# Patient Record
Sex: FEMALE | Race: OTHER | Employment: FULL TIME | URBAN - METROPOLITAN AREA
[De-identification: names, ages, dates, MRNs, and addresses within clinical notes are randomized per-mention and may not be internally consistent; named-entity substitution may affect disease eponyms.]

---

## 2018-09-11 PROBLEM — Z34.90 PREGNANCY: Status: ACTIVE | Noted: 2018-09-11

## 2018-09-17 PROBLEM — N39.0 E-COLI UTI: Status: ACTIVE | Noted: 2018-09-17

## 2018-09-17 PROBLEM — B96.20 E-COLI UTI: Status: ACTIVE | Noted: 2018-09-17

## 2018-09-17 PROBLEM — Z28.39 MATERNAL VARICELLA, NON-IMMUNE: Status: ACTIVE | Noted: 2018-09-17

## 2018-09-17 PROBLEM — O09.899 MATERNAL VARICELLA, NON-IMMUNE: Status: ACTIVE | Noted: 2018-09-17

## 2018-09-17 PROBLEM — O09.899 RUBELLA NON-IMMUNE STATUS, ANTEPARTUM: Status: ACTIVE | Noted: 2018-09-17

## 2018-09-17 PROBLEM — Z28.39 RUBELLA NON-IMMUNE STATUS, ANTEPARTUM: Status: ACTIVE | Noted: 2018-09-17

## 2019-03-27 ENCOUNTER — HOSPITAL ENCOUNTER (INPATIENT)
Age: 19
LOS: 2 days | Discharge: HOME | End: 2019-03-29
Attending: OBSTETRICS & GYNECOLOGY | Admitting: OBSTETRICS & GYNECOLOGY
Payer: COMMERCIAL

## 2019-03-27 ENCOUNTER — ANESTHESIA EVENT (OUTPATIENT)
Dept: LABOR AND DELIVERY | Age: 19
End: 2019-03-27
Payer: COMMERCIAL

## 2019-03-27 ENCOUNTER — ANESTHESIA (OUTPATIENT)
Dept: LABOR AND DELIVERY | Age: 19
End: 2019-03-27
Payer: COMMERCIAL

## 2019-03-27 PROBLEM — R10.9 ABDOMINAL PAIN DURING PREGNANCY IN THIRD TRIMESTER: Status: ACTIVE | Noted: 2019-03-27

## 2019-03-27 PROBLEM — O26.893 ABDOMINAL PAIN DURING PREGNANCY IN THIRD TRIMESTER: Status: ACTIVE | Noted: 2019-03-27

## 2019-03-27 PROBLEM — Z37.9 NORMAL LABOR: Status: ACTIVE | Noted: 2019-03-27

## 2019-03-27 LAB
ABO + RH BLD: NORMAL
ARTERIAL PATENCY WRIST A: ABNORMAL
ARTERIAL PATENCY WRIST A: ABNORMAL
BASE DEFICIT BLD-SCNC: 5 MMOL/L
BASE DEFICIT BLD-SCNC: 5 MMOL/L
BDY SITE: ABNORMAL
BDY SITE: ABNORMAL
BLOOD GROUP ANTIBODIES SERPL: NORMAL
BODY TEMPERATURE: 98.6
BODY TEMPERATURE: 98.6
CO2 BLD-SCNC: 20 MMOL/L
CO2 BLD-SCNC: 21 MMOL/L
COLLECT TIME,HTIME: 1444
COLLECT TIME,HTIME: 1444
ERYTHROCYTE [DISTWIDTH] IN BLOOD BY AUTOMATED COUNT: 13.8 % (ref 11.9–14.6)
GAS FLOW.O2 O2 DELIVERY SYS: ABNORMAL L/MIN
GAS FLOW.O2 O2 DELIVERY SYS: ABNORMAL L/MIN
HCO3 BLD-SCNC: 18.9 MMOL/L (ref 22–26)
HCO3 BLDV-SCNC: 20 MMOL/L (ref 23–28)
HCT VFR BLD AUTO: 42.9 % (ref 35.8–46.3)
HGB BLD-MCNC: 14.5 G/DL (ref 11.7–15.4)
MCH RBC QN AUTO: 27.1 PG (ref 26.1–32.9)
MCHC RBC AUTO-ENTMCNC: 33.8 G/DL (ref 31.4–35)
MCV RBC AUTO: 80 FL (ref 79.6–97.8)
NRBC # BLD: 0 K/UL (ref 0–0.2)
PCO2 BLDCO: 31 MMHG (ref 32–68)
PCO2 BLDCO: 35 MMHG (ref 32–68)
PH BLDCO: 7.37 [PH] (ref 7.15–7.38)
PH BLDCO: 7.39 [PH] (ref 7.15–7.38)
PLATELET # BLD AUTO: 271 K/UL (ref 150–450)
PMV BLD AUTO: 11.1 FL (ref 9.4–12.3)
PO2 BLDCO: 36 MMHG
PO2 BLDCO: 37 MMHG
RBC # BLD AUTO: 5.36 M/UL (ref 4.05–5.2)
SAO2 % BLD: 69 % (ref 95–98)
SAO2 % BLDV: 70 % (ref 65–88)
SERVICE CMNT-IMP: ABNORMAL
SERVICE CMNT-IMP: ABNORMAL
SPECIMEN EXP DATE BLD: NORMAL
SPECIMEN TYPE: ABNORMAL
SPECIMEN TYPE: ABNORMAL
WBC # BLD AUTO: 6.8 K/UL (ref 4.3–11.1)

## 2019-03-27 PROCEDURE — 77030018846 HC SOL IRR STRL H20 ICUM -A

## 2019-03-27 PROCEDURE — 4A1HXCZ MONITORING OF PRODUCTS OF CONCEPTION, CARDIAC RATE, EXTERNAL APPROACH: ICD-10-PCS | Performed by: OBSTETRICS & GYNECOLOGY

## 2019-03-27 PROCEDURE — 76060000078 HC EPIDURAL ANESTHESIA

## 2019-03-27 PROCEDURE — 77030020255 HC SOL INJ LR 1000ML BG

## 2019-03-27 PROCEDURE — 75410000002 HC LABOR FEE PER 1 HR

## 2019-03-27 PROCEDURE — 75410000003 HC RECOV DEL/VAG/CSECN EA 0.5 HR

## 2019-03-27 PROCEDURE — A4300 CATH IMPL VASC ACCESS PORTAL: HCPCS | Performed by: ANESTHESIOLOGY

## 2019-03-27 PROCEDURE — 77030007880 HC KT SPN EPDRL BBMI -B: Performed by: ANESTHESIOLOGY

## 2019-03-27 PROCEDURE — 77030011943

## 2019-03-27 PROCEDURE — 10907ZC DRAINAGE OF AMNIOTIC FLUID, THERAPEUTIC FROM PRODUCTS OF CONCEPTION, VIA NATURAL OR ARTIFICIAL OPENING: ICD-10-PCS | Performed by: OBSTETRICS & GYNECOLOGY

## 2019-03-27 PROCEDURE — 77030014125 HC TY EPDRL BBMI -B: Performed by: ANESTHESIOLOGY

## 2019-03-27 PROCEDURE — 74011250636 HC RX REV CODE- 250/636: Performed by: OBSTETRICS & GYNECOLOGY

## 2019-03-27 PROCEDURE — 82803 BLOOD GASES ANY COMBINATION: CPT

## 2019-03-27 PROCEDURE — 74011250637 HC RX REV CODE- 250/637: Performed by: OBSTETRICS & GYNECOLOGY

## 2019-03-27 PROCEDURE — 75410000000 HC DELIVERY VAGINAL/SINGLE

## 2019-03-27 PROCEDURE — 86900 BLOOD TYPING SEROLOGIC ABO: CPT

## 2019-03-27 PROCEDURE — 99282 EMERGENCY DEPT VISIT SF MDM: CPT

## 2019-03-27 PROCEDURE — 74011250636 HC RX REV CODE- 250/636: Performed by: ANESTHESIOLOGY

## 2019-03-27 PROCEDURE — 65270000029 HC RM PRIVATE

## 2019-03-27 PROCEDURE — 85027 COMPLETE CBC AUTOMATED: CPT

## 2019-03-27 PROCEDURE — 74011000250 HC RX REV CODE- 250

## 2019-03-27 PROCEDURE — 74011250636 HC RX REV CODE- 250/636

## 2019-03-27 RX ORDER — LIDOCAINE HYDROCHLORIDE 20 MG/ML
JELLY TOPICAL
Status: DISCONTINUED | OUTPATIENT
Start: 2019-03-27 | End: 2019-03-27 | Stop reason: HOSPADM

## 2019-03-27 RX ORDER — FENTANYL CITRATE 50 UG/ML
INJECTION, SOLUTION INTRAMUSCULAR; INTRAVENOUS AS NEEDED
Status: DISCONTINUED | OUTPATIENT
Start: 2019-03-27 | End: 2019-03-27 | Stop reason: HOSPADM

## 2019-03-27 RX ORDER — IBUPROFEN 800 MG/1
800 TABLET ORAL
Status: DISCONTINUED | OUTPATIENT
Start: 2019-03-27 | End: 2019-03-29 | Stop reason: HOSPADM

## 2019-03-27 RX ORDER — ROPIVACAINE HYDROCHLORIDE 2 MG/ML
INJECTION, SOLUTION EPIDURAL; INFILTRATION; PERINEURAL
Status: DISCONTINUED | OUTPATIENT
Start: 2019-03-27 | End: 2019-03-27 | Stop reason: HOSPADM

## 2019-03-27 RX ORDER — BUTORPHANOL TARTRATE 2 MG/ML
1 INJECTION INTRAMUSCULAR; INTRAVENOUS
Status: DISCONTINUED | OUTPATIENT
Start: 2019-03-27 | End: 2019-03-27 | Stop reason: HOSPADM

## 2019-03-27 RX ORDER — LIDOCAINE HYDROCHLORIDE AND EPINEPHRINE 15; 5 MG/ML; UG/ML
INJECTION, SOLUTION EPIDURAL
Status: COMPLETED | OUTPATIENT
Start: 2019-03-27 | End: 2019-03-27

## 2019-03-27 RX ORDER — DIPHENHYDRAMINE HCL 25 MG
25 CAPSULE ORAL
Status: DISCONTINUED | OUTPATIENT
Start: 2019-03-27 | End: 2019-03-29 | Stop reason: HOSPADM

## 2019-03-27 RX ORDER — DEXTROSE, SODIUM CHLORIDE, SODIUM LACTATE, POTASSIUM CHLORIDE, AND CALCIUM CHLORIDE 5; .6; .31; .03; .02 G/100ML; G/100ML; G/100ML; G/100ML; G/100ML
125 INJECTION, SOLUTION INTRAVENOUS CONTINUOUS
Status: DISCONTINUED | OUTPATIENT
Start: 2019-03-27 | End: 2019-03-27 | Stop reason: HOSPADM

## 2019-03-27 RX ORDER — ONDANSETRON 4 MG/1
4 TABLET, ORALLY DISINTEGRATING ORAL
Status: ACTIVE | OUTPATIENT
Start: 2019-03-27 | End: 2019-03-28

## 2019-03-27 RX ORDER — MINERAL OIL
120 OIL (ML) ORAL
Status: COMPLETED | OUTPATIENT
Start: 2019-03-27 | End: 2019-03-27

## 2019-03-27 RX ORDER — FENTANYL CITRATE 50 UG/ML
INJECTION, SOLUTION INTRAMUSCULAR; INTRAVENOUS
Status: COMPLETED
Start: 2019-03-27 | End: 2019-03-27

## 2019-03-27 RX ORDER — OXYTOCIN/RINGER'S LACTATE 15/250 ML
250 PLASTIC BAG, INJECTION (ML) INTRAVENOUS ONCE
Status: COMPLETED | OUTPATIENT
Start: 2019-03-27 | End: 2019-03-27

## 2019-03-27 RX ORDER — SODIUM CHLORIDE 0.9 % (FLUSH) 0.9 %
5-40 SYRINGE (ML) INJECTION EVERY 8 HOURS
Status: DISCONTINUED | OUTPATIENT
Start: 2019-03-27 | End: 2019-03-27 | Stop reason: HOSPADM

## 2019-03-27 RX ORDER — HYDROCODONE BITARTRATE AND ACETAMINOPHEN 5; 325 MG/1; MG/1
1 TABLET ORAL
Status: DISCONTINUED | OUTPATIENT
Start: 2019-03-27 | End: 2019-03-29 | Stop reason: HOSPADM

## 2019-03-27 RX ORDER — DOCUSATE SODIUM 100 MG/1
100 CAPSULE, LIQUID FILLED ORAL 2 TIMES DAILY
Status: DISCONTINUED | OUTPATIENT
Start: 2019-03-27 | End: 2019-03-29 | Stop reason: HOSPADM

## 2019-03-27 RX ORDER — SIMETHICONE 80 MG
80 TABLET,CHEWABLE ORAL
Status: DISCONTINUED | OUTPATIENT
Start: 2019-03-27 | End: 2019-03-29 | Stop reason: HOSPADM

## 2019-03-27 RX ORDER — LIDOCAINE HYDROCHLORIDE 10 MG/ML
1 INJECTION INFILTRATION; PERINEURAL
Status: DISCONTINUED | OUTPATIENT
Start: 2019-03-27 | End: 2019-03-27 | Stop reason: HOSPADM

## 2019-03-27 RX ORDER — FENTANYL CITRATE 50 UG/ML
100 INJECTION, SOLUTION INTRAMUSCULAR; INTRAVENOUS ONCE
Status: DISCONTINUED | OUTPATIENT
Start: 2019-03-27 | End: 2019-03-27

## 2019-03-27 RX ORDER — SODIUM CHLORIDE 0.9 % (FLUSH) 0.9 %
5-40 SYRINGE (ML) INJECTION AS NEEDED
Status: DISCONTINUED | OUTPATIENT
Start: 2019-03-27 | End: 2019-03-27 | Stop reason: HOSPADM

## 2019-03-27 RX ORDER — DIPHENHYDRAMINE HYDROCHLORIDE 50 MG/ML
12.5 INJECTION, SOLUTION INTRAMUSCULAR; INTRAVENOUS
Status: COMPLETED | OUTPATIENT
Start: 2019-03-27 | End: 2019-03-27

## 2019-03-27 RX ADMIN — IBUPROFEN 800 MG: 800 TABLET, FILM COATED ORAL at 17:57

## 2019-03-27 RX ADMIN — MINERAL OIL 120 ML: 471.95 OIL ORAL at 14:56

## 2019-03-27 RX ADMIN — SODIUM CHLORIDE, SODIUM LACTATE, POTASSIUM CHLORIDE, AND CALCIUM CHLORIDE 500 ML: 600; 310; 30; 20 INJECTION, SOLUTION INTRAVENOUS at 11:56

## 2019-03-27 RX ADMIN — LIDOCAINE HYDROCHLORIDE AND EPINEPHRINE 4.5 ML: 15; 5 INJECTION, SOLUTION EPIDURAL at 12:09

## 2019-03-27 RX ADMIN — DOCUSATE SODIUM 100 MG: 100 CAPSULE, LIQUID FILLED ORAL at 17:57

## 2019-03-27 RX ADMIN — FENTANYL CITRATE 15 MCG: 50 INJECTION, SOLUTION INTRAMUSCULAR; INTRAVENOUS at 12:08

## 2019-03-27 RX ADMIN — SODIUM CHLORIDE, SODIUM LACTATE, POTASSIUM CHLORIDE, CALCIUM CHLORIDE, AND DEXTROSE MONOHYDRATE 125 ML/HR: 600; 310; 30; 20; 5 INJECTION, SOLUTION INTRAVENOUS at 12:00

## 2019-03-27 RX ADMIN — FENTANYL CITRATE 85 MCG: 50 INJECTION, SOLUTION INTRAMUSCULAR; INTRAVENOUS at 12:09

## 2019-03-27 RX ADMIN — DIPHENHYDRAMINE HYDROCHLORIDE 12.5 MG: 50 INJECTION, SOLUTION INTRAMUSCULAR; INTRAVENOUS at 13:14

## 2019-03-27 RX ADMIN — ROPIVACAINE HYDROCHLORIDE 10 ML/HR: 2 INJECTION, SOLUTION EPIDURAL; INFILTRATION; PERINEURAL at 12:09

## 2019-03-27 RX ADMIN — Medication 15000 MILLI-UNITS/HR: at 14:49

## 2019-03-27 NOTE — PROGRESS NOTES
Labor Progress Note Patient seen, fetal heart rate and contraction pattern evaluated, patient examined. Patient Vitals for the past 1 hrs: 
 BP Pulse 03/27/19 1345 109/64 65  
03/27/19 1331 103/62 64  
03/27/19 1314 102/59 67 Physical Exam: 
Cervical Exam:  (Ant-lip)/100 %/0/ Membranes:  Artificial Rupture of Membranes; Amniotic Fluid: medium amount of clear fluid Uterine Activity: Frequency: Every 2 minutes Fetal Heart Rate: Reactive Assessment/Plan: 
Reassuring fetal status, Continue plan for vaginal delivery

## 2019-03-27 NOTE — PROGRESS NOTES
Assisted to bathroom. Voided 300 ml clear yellow urine. Tamara care taught and voiced understanding. Returned to bed with assistance. Sitting on side of bed at present per request. Tolerated well.

## 2019-03-27 NOTE — PROGRESS NOTES
SVE as documented. 1430  ml 
1435 Begin pushing. 1442 Call for delivery staff. Set up for delivery. 65  female apgars 9&9. Infant to warmer per mom's request.  
3545 Placenta expressed. Pitocin infusing. No repair needed. 1500 Legs down, kulwant care completed. Recovery begun.

## 2019-03-27 NOTE — ROUTINE PROCESS
SBAR IN Report: Mother Verbal report received from [address] [place] [address] on this patient, who is now being transferred from L&D for routine progression of care. The patient is not wearing a green \"Anesthesia-Duramorph\" band. Report consisted of patient's Situation, Background, Assessment and Recommendations (SBAR). [place] ID bands were compared with the identification form, and verified with the patient and transferring nurse. Information from the SBAR, Intake/Output, MAR and Recent Results and the [place] Report was reviewed with the transferring nurse; opportunity for questions and clarification provided.

## 2019-03-27 NOTE — PROGRESS NOTES
Admitted for labor. Admission assessment as documented. Oriented to room, call system and white board. Plan of care reviewed and questions answered. Consents signed, identification band verified and placed. IV placed, blood work drawn per order and sent to lab.

## 2019-03-27 NOTE — PROGRESS NOTES
1200 Anesthesia at bedside. Pt to SOB. 1210 Epidural catheter in place, test dose given, see anesthesia records, serial BP's begin.

## 2019-03-27 NOTE — L&D DELIVERY NOTE
Delivery Summary    Patient: Dk Hernandez MRN: [id number]  SSN: [id number]    YOB: 2000  Age: 19 y.o. Sex: female       Information for the patient's :  Elie Dutta [[id number]]       Labor Events:    Labor: No   Rupture Date: 3/27/2019   Rupture Time: 2:01 PM   Rupture Type AROM   Amniotic Fluid Volume: Moderate    Amniotic Fluid Description: Clear None   Induction: None       Augmentation: None   Labor Events: None     Cervical Ripening:     None     Delivery Events:  Episiotomy: None   Laceration(s): None     Repaired: None    Number of Repair Packets:     Suture Type and Size:       Estimated Blood Loss (ml):  ml       Delivery Date: 3/27/2019    Delivery Time: 2:44 PM  Delivery Type: Vaginal, Spontaneous  Sex:  Female     Gestational Age: 43w4d   Delivery Clinician: Mo Gutierrez  Living Status: Living   Delivery Location: & 422          APGARS  One minute Five minutes Ten minutes   Skin color: 1   1        Heart rate: 2   2        Grimace: 2   2        Muscle tone: 2   2        Breathin   2        Totals: 9   9            Presentation: Vertex    Position: Left Occiput Anterior  Resuscitation Method:  Tactile Stimulation;Suctioning-bulb     Meconium Stained: None      Cord Information: 3 Vessels  Complications: None  Cord around:    Delayed cord clamping? Yes  Cord clamped date/time:3/27/2019  2:46 PM  Disposition of Cord Blood: Lab    Blood Gases Sent?: Yes    Placenta:  Date/Time: 3/27/2019  2:49 PM  Removal: Spontaneous      Appearance: Normal;Intact      Measurements:  Birth Weight: 6 lb 8.2 oz (2.955 kg)      Birth Length: 1' 7.88\" (0.505 m)      Head Circumference: 1' 0.6\" (0.32 m)      Chest Circumference: 1' 0.6\" (0.32 m)     Abdominal Girth:       Other Providers:   DIAMOND Phillips;DALILA DUENAS;BIA MA;CORINNE Mata [facility];Nolan MARK, Obstetrician;Primary Nurse;Primary  Nurse; Anesthesiologist;Crna;Scrub Tech;Charge Nurse           Group B Strep:   Lab Results   Component Value Date/Time    GrBStrep, External negative 2019     Information for the patient's :  Cloteal Humberto [[id number]]   No results found for: ABORH, PCTABR, PCTDIG, BILI, ABORHEXT, ABORH    No results for input(s): PCO2CB, PO2CB, HCO3I, SO2I, IBD, PTEMPI, SPECTI, PHICB, ISITE, IDEV, IALLEN in the last 72 hours.

## 2019-03-27 NOTE — ANESTHESIA PREPROCEDURE EVALUATION
Relevant Problems No relevant active problems Anesthetic History No history of anesthetic complications Review of Systems / Medical History Patient summary reviewed and pertinent labs reviewed Pulmonary Within defined limits Neuro/Psych Within defined limits Cardiovascular Within defined limits Exercise tolerance: >4 METS 
  
GI/Hepatic/Renal 
  
GERD: well controlled Endo/Other Within defined limits Other Findings Physical Exam 
 
Airway Mallampati: II 
TM Distance: 4 - 6 cm Neck ROM: normal range of motion Mouth opening: Normal 
 
 Cardiovascular Regular rate and rhythm,  S1 and S2 normal,  no murmur, click, rub, or gallop Dental 
 
 
  
Pulmonary Breath sounds clear to auscultation Abdominal 
 
 
 
 Other Findings Anesthetic Plan ASA: 2 Anesthesia type: CSE Post-op pain plan if not by surgeon: epidural opioid, indwelling epidural catheter and intrathecal opiates Anesthetic plan and risks discussed with: Patient and Spouse

## 2019-03-27 NOTE — PROGRESS NOTES
Pt here to triage, doubled over hurting with contractions. States she was 3 cms in the office yesterday. Pt placed in , dr Carleen Maldonado at bs for sve- 6-7 cms and admitted to labor room.  Would like an epidural.

## 2019-03-27 NOTE — ANESTHESIA PROCEDURE NOTES
CSE Block Start time: 3/27/2019 12:05 PM 
End time: 3/27/2019 12:10 PM 
Performed by: Lakisha Ramirez MD 
Authorized by: Lakisha Ramirez MD  
 
Pre-Procedure Indications: at surgeon's request and primary anesthetic   
preanesthetic checklist: patient identified, risks and benefits discussed, anesthesia consent, site marked, patient being monitored and timeout performed Timeout Time: 12:03 Procedure:  
Patient Position:  Seated Prep Region:  Lumbar Prep: chlorhexidine Location:  L2-3 Epidural Needle:  
Needle Type:  Tuohy Needle Gauge:  18 G Injection Technique:  Loss of resistance using saline Attempts:  1 Spinal Needle:  
Needle Type:  Quincke Needle Gauge:  25 G Catheter:  
Catheter Type:  Open end Catheter Size:  20 G Catheter at Skin Depth (cm):  5 Depth in Epidural Space (cm):  5 Events: no blood with aspiration, no cerebrospinal fluid with aspiration, no paresthesia and negative aspiration test   
Test Dose:  Lidocaine 1.5% w/ epi and negative Assessment:  
Catheter Secured:  Tegaderm and tape Insertion:  Uncomplicated Patient tolerance:  Patient tolerated the procedure well with no immediate complications

## 2019-03-27 NOTE — PROGRESS NOTES
1700 patient with heavy bleeding on pad with couple small clots on pad, fundus +1, slight right, massaged fundus, expressed couple more small clots, walnut size Patient assisted to side of bed, assistance of 2 staff, patient stood and knees immediately buckled, patient assisted back to bed 1745 I & O cath attempted x1, soft tissue around urethra with edema, difficult to locate, bleeding much better, no trickle, fundus firm +1, midline to slight right Will continue to monitor

## 2019-03-27 NOTE — H&P
History & Physical 
 
Name: Yue Yeh MRN: [id number]  SSN: [id number] YOB: 2000  Age: 19 y.o. Sex: female Chief c/o: painful contractions Subjective:  
 
Estimated Date of Delivery: 19 OB History  Para Term  AB Living 2 1 1 0 0 1 SAB TAB Ectopic Molar Multiple Live Births  
0 0 0   0 1 # Outcome Date GA Lbr Prakash/2nd Weight Sex Delivery Anes PTL Lv  
2 Current 1 Term 10/23/16 37w4d  2.863 kg M Vag-Spont   JAQUELINE Birth Comments: S Ms. Apurva Ndiaye is admitted with pregnancy at 38w1d for active labor. Prenatal course was normal. Please see prenatal records for details. Past Medical History:  
Diagnosis Date  Postpartum depression No past surgical history on file. Social History Occupational History  Not on file Tobacco Use  Smoking status: Never Smoker  Smokeless tobacco: Never Used Substance and Sexual Activity  Alcohol use: No  
  Comment: none with +UPT  Drug use: No  
 Sexual activity: Yes  
  Partners: Male Family History Problem Relation Age of Onset  Diabetes Mother  Hypertension Maternal Grandmother  No Known Problems Father No Known Allergies Prior to Admission medications Medication Sig Start Date End Date Taking? Authorizing Provider  
cholecalciferol, vitamin D3, 2,000 unit tab Take  by mouth. Provider, Historical  
BABY ASPIRIN PO Take  by mouth. Provider, Historical  
PRENATAL VIT W-CA,FE,FA,<1 MG, (PRENATAL #2 PO) Take  by mouth daily. Other, MD Becky  
  
 
Review of Systems: A comprehensive review of systems was negative except for that written in the HPI. Objective:  
 
Vitals: There were no vitals filed for this visit. Physical Exam: 
Patient without distress. Heart: Regular rate and rhythm Lung: clear to auscultation throughout lung fields, no wheezes, no rales, no rhonchi and normal respiratory effort Abdomen: soft, nontender Fundus: soft and non tender Perineum: blood absent, amniotic fluid absent Cervical Exam: 7 cm dilated 100% effaced 0 station Presenting Part: cephalic Lower Extremities:  - Edema No 
 - Patellar Reflexes: 2+ bilaterally Membranes:  Intact Fetal Heart Rate: Reactive Prenatal Labs:  
Lab Results Component Value Date/Time  
 Rubella, External non immune 2018 GrBStrep, External negative 2019 HBsAg, External negative 2018 HIV, External NR 2018 RPR, External NR 2018 ABO,Rh O positive 2018 Assessment/Plan: Active Problems: 
  Abdominal pain during pregnancy in third trimester (3/27/2019) Normal labor (3/27/2019) Plan: 20 yo  at [address] for Reassuring fetal status, Labor  Progressing normally, Continue plan for vaginal delivery. Group B Strep was negative. Signed By:  Jorge Luis Zazueta MD   
 2019

## 2019-03-27 NOTE — PROGRESS NOTES
SBAR OUT Report: Mother Verbal report given to Georgia Navarro RN on this patient, who is now being transferred to MIU for routine progression of care. The patient is not wearing a green \"Anesthesia-Duramorph\" band. Report consisted of patient's Situation, Background, Assessment and Recommendations (SBAR).  ID bands were compared with the identification form, and verified with the patient and receiving nurse. Information from the SBAR and the [address] [facility] Report was reviewed with the receiving nurse; opportunity for questions and clarification provided.

## 2019-03-27 NOTE — PROGRESS NOTES
Pt reports feeling pressure. SVE as documented. MD made aware. MD in sx downstairs. Will be up shortly to assess.

## 2019-03-27 NOTE — PROGRESS NOTES
available from 4:30 p.m. - 1:00 a.m. Please call [phone] with any interpreting requests. Thank you, TARAH Wild / 
Joselito Martinez Patient Relations & Interpreting Services 
c: [phone] / [email or web address] 
[address] [place] [address] / [place], [address] [place] [address] 
[email or web address]

## 2019-03-28 PROCEDURE — 65270000029 HC RM PRIVATE

## 2019-03-28 PROCEDURE — 74011250637 HC RX REV CODE- 250/637: Performed by: OBSTETRICS & GYNECOLOGY

## 2019-03-28 RX ADMIN — IBUPROFEN 800 MG: 800 TABLET, FILM COATED ORAL at 23:49

## 2019-03-28 RX ADMIN — HYDROCODONE BITARTRATE AND ACETAMINOPHEN 1 TABLET: 5; 325 TABLET ORAL at 09:21

## 2019-03-28 RX ADMIN — DOCUSATE SODIUM 100 MG: 100 CAPSULE, LIQUID FILLED ORAL at 18:19

## 2019-03-28 RX ADMIN — IBUPROFEN 800 MG: 800 TABLET, FILM COATED ORAL at 18:19

## 2019-03-28 RX ADMIN — DOCUSATE SODIUM 100 MG: 100 CAPSULE, LIQUID FILLED ORAL at 09:21

## 2019-03-28 RX ADMIN — IBUPROFEN 800 MG: 800 TABLET, FILM COATED ORAL at 04:24

## 2019-03-28 NOTE — PROGRESS NOTES
Chart reviewed - patient with a history of PPD with first child ().  met with patient and FOB. Patient states that onset of PPD was \"right away\" and lasted until 2018. Patient states that she did not seek medication, and she attended 1 therapy session. Patient states that her mood has been \"good\" during pregnancy. She denies any current anxiety/depression since delivery. Patient reports having a large local support system. Education provided on increased chances of experiencing postpartum depression due to history. Patient/ were encouraged to monitor her feelings during this next year.  provided informational packet on  mood disorder education/resources. Family receptive to receiving information and denied any additional needs from . Family has this 's contact information should any needs/questions arise. Patient does not have a PCP - list of PCPs provided. Dari Mench[address]

## 2019-03-28 NOTE — LACTATION NOTE

## 2019-03-28 NOTE — PROGRESS NOTES
Intepreter Available onsite for any request from 8:00 a.m to 4:30 p.m. Rosamaria Linda / [place] [phone]

## 2019-03-28 NOTE — PROGRESS NOTES
available on-site from 4:30 p.m. - 1:00 a.m. Please call [phone] with any interpreting requests. Thank you, TARAH Hand / 
Janneth Sandoval Patient Relations & Interpreting Services 
c: [phone] / [email or web address] [address] [facility] [address] / [place], [address] [facility] 
[email or web address]

## 2019-03-28 NOTE — PROGRESS NOTES
Post-Partum Day Number 1 Progress Note Patient doing well post-partum without significant complaint. Voiding withour difficulty, normal lochia. Vitals:   
Patient Vitals for the past 8 hrs: 
 BP Temp Pulse Resp SpO2  
19 0750 94/46 97.3 °F (36.3 °C) 70 16 98 % Temp (24hrs), Av.9 °F (36.6 °C), Min:97.3 °F (36.3 °C), Max:98.6 °F (37 °C) Vital signs stable, afebrile. Exam:  Patient without distress. Abdomen soft, fundus firm at level of umbilicus, nontender Perineum with normal lochia noted. Lower extremities are negative for swelling, cords or tenderness. Labs:  
Recent Results (from the past 24 hour(s)) CBC W/O DIFF Collection Time: 19 11:47 AM  
Result Value Ref Range WBC 6.8 4.3 - 11.1 K/uL  
 RBC 5.36 (H) 4.05 - 5.2 M/uL  
 HGB 14.5 11.7 - 15.4 g/dL HCT 42.9 35.8 - 46.3 % MCV 80.0 79.6 - 97.8 FL  
 MCH 27.1 26.1 - 32.9 PG  
 MCHC 33.8 31.4 - 35.0 g/dL  
 RDW 13.8 11.9 - 14.6 % PLATELET 926 198 - 935 K/uL MPV 11.1 9.4 - 12.3 FL ABSOLUTE NRBC 0.00 0.0 - 0.2 K/uL  
TYPE & SCREEN Collection Time: 19 11:47 AM  
Result Value Ref Range Crossmatch Expiration 2019 ABO/Rh(D) O POSITIVE Antibody screen NEG   
BLOOD GAS, CORD BLOOD Collection Time: 19  2:56 PM  
Result Value Ref Range Device: ROOM AIR    
 pH, cord blood (POC) 7.391 (H) 7.15 - 7.38    
 pCO2 cord blood 31 (L) 32 - 68 mmHg  
 pO2 cord blood 36 mmHg HCO3 (POC) 18.9 (L) 22 - 26 MMOL/L  
 sO2 (POC) 69 (L) 95 - 98 % Base deficit (POC) 5 mmol/L Allens test (POC) NOT APPLICABLE Site CORD Patient temp. 98.6 Specimen type (POC) ARTERIAL CORD Performed by Genny   
 CO2, POC 20 MMOL/L  
 COLLECT TIME 1,444 BLOOD GAS, CORD BLOOD Collection Time: 19  2:56 PM  
Result Value Ref Range  Device: ROOM AIR    
 pH, cord blood (POC) 7.369 7.15 - 7.38    
 pCO2 cord blood 35 32 - 68 mmHg  
 pO2 cord blood 37 mmHg HCO3, venous (POC) 20.0 (L) 23 - 28 MMOL/L  
 sO2, venous (POC) 70 65 - 88 % Base deficit (POC) 5 mmol/L Allens test (POC) NOT APPLICABLE Site CORD Patient temp. 98.6 Specimen type (POC) VENOUS CORD Performed by Genny   
 CO2, POC 21 MMOL/L  
 COLLECT TIME 1,444 Assessment and Plan:  Patient appears to be having uncomplicated post-partum course. Continue routine perineal care and maternal education. Plan discharge tomorrow if no problems occur. Pt complaining of low BP- had several low BP's antepartum likely related to residual epidural and or narcotics Encouraged to hydrate slow position changes

## 2019-03-28 NOTE — LACTATION NOTE
This note was copied from a baby's chart. Mom stated difficulty in latching on L. Planned to assist at next feeding. Mom already had baby on well in cradle on L at Lactation's arrival.  Mom uncomfortable. Assisted with breastfeeding positioning. Supported moms back and arms for more comfort. Baby fed well. Demonstrated manual lip flange. Encouraged frequent feeding and watch output. Encouraged to unwrap baby for more awake feedings.

## 2019-03-28 NOTE — PROGRESS NOTES
03/28/19 0425 Pain Assessment Pain Scale 1 Numeric (0 - 10) Pain Intensity 1 3 Pain Location 1 Abdomen Pain Orientation 1 Lower Pain Description 1 Cramping Pain Intervention(s) 1 Medication (see MAR) Motrin 800mg given for pain.

## 2019-03-29 VITALS
DIASTOLIC BLOOD PRESSURE: 61 MMHG | TEMPERATURE: 97.7 F | HEART RATE: 81 BPM | RESPIRATION RATE: 16 BRPM | OXYGEN SATURATION: 97 % | SYSTOLIC BLOOD PRESSURE: 100 MMHG

## 2019-03-29 PROCEDURE — 90715 TDAP VACCINE 7 YRS/> IM: CPT | Performed by: OBSTETRICS & GYNECOLOGY

## 2019-03-29 PROCEDURE — 74011250636 HC RX REV CODE- 250/636: Performed by: OBSTETRICS & GYNECOLOGY

## 2019-03-29 PROCEDURE — 74011250637 HC RX REV CODE- 250/637: Performed by: OBSTETRICS & GYNECOLOGY

## 2019-03-29 PROCEDURE — 90707 MMR VACCINE SC: CPT | Performed by: OBSTETRICS & GYNECOLOGY

## 2019-03-29 RX ORDER — IBUPROFEN 800 MG/1
800 TABLET ORAL
Qty: 40 TAB | Refills: 1 | Status: SHIPPED | OUTPATIENT
Start: 2019-03-29

## 2019-03-29 RX ADMIN — TETANUS TOXOID, REDUCED DIPHTHERIA TOXOID AND ACELLULAR PERTUSSIS VACCINE, ADSORBED 0.5 ML: 5; 2.5; 8; 8; 2.5 SUSPENSION INTRAMUSCULAR at 09:48

## 2019-03-29 RX ADMIN — IBUPROFEN 800 MG: 800 TABLET, FILM COATED ORAL at 05:37

## 2019-03-29 RX ADMIN — DOCUSATE SODIUM 100 MG: 100 CAPSULE, LIQUID FILLED ORAL at 09:46

## 2019-03-29 RX ADMIN — HYDROCODONE BITARTRATE AND ACETAMINOPHEN 1 TABLET: 5; 325 TABLET ORAL at 09:46

## 2019-03-29 RX ADMIN — MEASLES, MUMPS, AND RUBELLA VIRUS VACCINE LIVE 0.5 ML: 1000; 12500; 1000 INJECTION, POWDER, LYOPHILIZED, FOR SUSPENSION SUBCUTANEOUS at 09:47

## 2019-03-29 NOTE — DISCHARGE SUMMARY
[address] OB Discharge Summary     Patient ID:  Shaq Abebe  [id number]  61 y.o.  2000    Admit date: 3/27/2019    Discharge date and time: No discharge date for patient encounter. Admitting Physician: Rambo López MD     Discharge Physician: Terry Lott M.D. Admission Diagnoses: Normal labor [O80, Z37.9]; Abdominal pain during pregnancy in third trimester [O26.893, R10.9]    Problem List: Hospital - Principal Problem:    Normal labor (3/27/2019)    Active Problems:    Abdominal pain during pregnancy in third trimester (3/27/2019)     ; Other -   Patient Active Problem List   Diagnosis Code    Pregnancy Z34.90    E-coli UTI N39.0, B96.20    Rubella non-immune status, antepartum O99.89, Z28.3    Maternal varicella, non-immune O09.899, Z28.3    Abdominal pain during pregnancy in third trimester O26.893, R10.9    Normal labor O80, Z37.9        Discharge Diagnoses: Normal labor [O80, Z37.9]; Abdominal pain during pregnancy in third trimester [O26.893, R10.9]    Hospital Course: Shaq Abebe had unremarkeable progressive recovery. and Eating, ambulating, and voiding in a routine manner. Significant Diagnostic Studies: No results found for this or any previous visit (from the past 24 hour(s)). Procedures: spontaneous vaginal delivery    Discharge Exam:  Visit Vitals  /61 (BP 1 Location: Left arm, BP Patient Position: At rest)   Pulse 81   Temp 97.7 °F (36.5 °C)   Resp 16   LMP 07/03/2018   SpO2 97%   Breastfeeding? Unknown        Heart: regular rate and rhythm, S1, S2 normal, no murmur, click, rub or gallop  Lungs:clear to auscultation bilaterally  Extremities: normal, atraumatic, no cyanosis or edema.  No DVT  Incision/episiotomy: no significant drainage, no dehiscence, no significant erythema    Patient Instructions:   Current Discharge Medication List      START taking these medications    Details   ibuprofen (MOTRIN) 800 mg tablet Take 1 Tab by mouth every eight (8) hours as needed for Pain. Indications: Pain  Qty: 40 Tab, Refills: 1         CONTINUE these medications which have NOT CHANGED    Details   PRENATAL VIT W-CA,FE,FA,<1 MG, (PRENATAL #2 PO) Take  by mouth daily. cholecalciferol, vitamin D3, 2,000 unit tab Take  by mouth. STOP taking these medications       BABY ASPIRIN PO Comments:   Reason for Stopping:              Activity: physical activity is restricted per discharge instructions  Diet: resume normal diet  Wound Care: Keep wound clean and dry, as directed    Follow-up with Nam in 2 weeks.     Signed:  Odalis Creon MD  3/29/2019  10:43 AM

## 2019-03-29 NOTE — PROGRESS NOTES
[address] March 29, 2019 RE: Oumar Nair To Whom it May Concern: This is to certify that Oumar Nair has been in the hospital from 3/27/2019 to 3/29/2019. Sincerely, Jelani Coles RN

## 2019-03-29 NOTE — DISCHARGE INSTRUCTIONS
Patient Education        After Your Delivery (the Postpartum Period): Care Instructions  Your Care Instructions    Congratulations on the birth of your baby. Like pregnancy, the  period can be a time of excitement, steven, and exhaustion. You may look at your wondrous little baby and feel happy. You may also be overwhelmed by your new sleep hours and new responsibilities. At first, babies often sleep during the days and are awake at night. They do not have a pattern or routine. They may make sudden gasps, jerk themselves awake, or look like they have crossed eyes. These are all normal, and they may even make you smile. In these first weeks after delivery, try to take good care of yourself. It may take 4 to 6 weeks to feel like yourself again, and possibly longer if you had a  birth. You will likely feel very tired for several weeks. Your days will be full of ups and downs, but lots of steven as well. Follow-up care is a key part of your treatment and safety. Be sure to make and go to all appointments, and call your doctor if you are having problems. It's also a good idea to know your test results and keep a list of the medicines you take. How can you care for yourself at home? Take care of your body after delivery  · Use pads instead of tampons for the bloody flow that may last as long as 2 weeks. · Ease cramps with ibuprofen (Advil, Motrin). · Ease soreness of hemorrhoids and the area between your vagina and rectum with ice compresses or witch hazel pads. · Ease constipation by drinking lots of fluid and eating high-fiber foods. Ask your doctor about over-the-counter stool softeners. · Cleanse yourself with a gentle squeeze of warm water from a bottle instead of wiping with toilet paper. · Take a sitz bath in warm water several times a day. · Wear a good nursing bra. Ease sore and swollen breasts with warm, wet washcloths.   · If you are not breastfeeding, use ice rather than heat for breast soreness. · Your period may not start for several months if you are breastfeeding. You may bleed more, and longer at first, than you did before you got pregnant. · Wait until you are healed (about 4 to 6 weeks) before you have sexual intercourse. Your doctor will tell you when it is okay to have sex. · Try not to travel with your baby for 5 or 6 weeks. If you take a long car trip, make frequent stops to walk around and stretch. Avoid exhaustion  · Rest every day. Try to nap when your baby naps. · Ask another adult to be with you for a few days after delivery. · Plan for  if you have other children. · Stay flexible so you can eat at odd hours and sleep when you need to. Both you and your baby are making new schedules. · Plan small trips to get out of the house. Change can make you feel less tired. · Ask for help with housework, cooking, and shopping. Remind yourself that your job is to care for your baby. Know about help for postpartum depression  · \"Baby blues\" are common for the first 1 to 2 weeks after birth. You may cry or feel sad or irritable for no reason. · Rest whenever you can. Being tired makes it harder to handle your emotions. · Go for walks with your baby. · Talk to your partner, friends, and family about your feelings. · If your symptoms last for more than a few weeks, or if you feel very depressed, ask your doctor for help. · Postpartum depression can be treated. Support groups and counseling can help. Sometimes medicine can also help. Stay healthy  · Eat healthy foods so you have more energy and lose extra baby pounds. · If you breastfeed, avoid drugs. If you quit smoking during pregnancy, try to stay smoke-free. If you choose to have a drink now and then, have only one drink, and limit the number of occasions that you have a drink. Wait to breastfeed at least 2 hours after you have a drink to reduce the amount of alcohol the baby may get in the milk.   · Start daily exercise after 4 to 6 weeks, but rest when you feel tired. · Learn exercises to tone your belly. Do Kegel exercises to regain strength in your pelvic muscles. You can do these exercises while you stand or sit. ? Squeeze the same muscles you would use to stop your urine. Your belly and thighs should not move. ? Hold the squeeze for 3 seconds, and then relax for 3 seconds. ? Start with 3 seconds. Then add 1 second each week until you are able to squeeze for 10 seconds. ? Repeat the exercise 10 to 15 times for each session. Do three or more sessions each day. · Find a class for new mothers and new babies that has an exercise time. · If you had a  birth, give yourself a bit more time before you exercise, and be careful. When should you call for help? Call 911 anytime you think you may need emergency care. For example, call if:    · You passed out (lost consciousness).    Call your doctor now or seek immediate medical care if:    · You have severe vaginal bleeding. This means you are passing blood clots and soaking through a pad each hour for 2 or more hours.     · You are dizzy or lightheaded, or you feel like you may faint.     · You have a fever.     · You have new belly pain, or your pain gets worse.    Watch closely for changes in your health, and be sure to contact your doctor if:    · Your vaginal bleeding seems to be getting heavier.     · You have new or worse vaginal discharge.     · You feel sad, anxious, or hopeless for more than a few days.     · You do not get better as expected. Where can you learn more? Go to http://karsten-benedict.info/. Enter A461 in the search box to learn more about \"After Your Delivery (the Postpartum Period): Care Instructions. \"  Current as of: 2018  Content Version: 11.9  © 6273-1351 [employer].  Care instructions adapted under license by [employer] (which disclaims liability or warranty for this information). If you have questions about a medical condition or this instruction, always ask your healthcare professional. [place] [address] any warranty or liability for your use of this information.

## 2019-03-29 NOTE — PROGRESS NOTES
03/28/19 7866 Pain Assessment Pain Scale 1 Numeric (0 - 10) Pain Intensity 1 2 Pain Location 1 Abdomen Pain Description 1 Cramping Pain Intervention(s) 1 Medication (see MAR) PRN Motrin 800mg  for pain

## 2019-03-29 NOTE — PROGRESS NOTES
03/29/19 1440 Pain Assessment Pain Scale 1 Numeric (0 - 10) Pain Intensity 1 4 Pain Location 1 Abdomen Pain Description 1 Aching;Cramping Pain Intervention(s) 1 Medication (see MAR) PRN Motrin 800mg for pain

## 2019-03-29 NOTE — LACTATION NOTE
Lactation visit. In to check on feedings. Mom reports baby latching well. Cluster feeding earlier today. Baby doing well, weight loss within normal limits but bilirubin level is high intermediate risk and baby has not had a stool in 24 hours, very small stool in night last night per mom. Discussed feeding needs, intake needs and output. Reviewed jaundice precautions. Discussed with mom about offering back some additional pumped milk but mom does not have a pump at home. Reviewed hand expression option. Mom agreeable to assistance with hand expression. Taught and demonstrated hand expression technique with mom giving return demonstration successfully. Did hand expression for only a few minutes total, returned 4ml colostrum. Taught Dad how to give extra colostrum to infant via syringe while infant sucked on his finger. Baby tolerated well. Parents reassured. Mom will continue to feed on demand, wake every 3 hours to feed. Cautioned that baby may be more sleepy with increased bilirubin level. Mom states understanding. Hand express and feed back expressed colostrum after every feeding if able. Encouraged ped follow up within 1-2 days for jaundice check.

## 2019-03-29 NOTE — LACTATION NOTE
Mom and baby are going home today. Continue to offer the breast without restriction. Mom's milk should be fully in over the next few days. Reviewed engorgement precautions. Hand Expression has been demoed and written hand-out reviewed. As milk comes in baby will be more alert at the breast and swallows will be more obvious. Breasts may feel softer once baby has finished nursing. Baby should be back to birth weight by 3weeks of age. And then gain on average 1 oz per day for the next 2-3 months. Reviewed babies should be exclusively breastfeeding for the first 6 months and that breastfeeding should continue after introduction of appropriate complimentary foods after 6 months. Initial output should be at least 1 wet and 1 bowel movement for each day old baby is. By day 5-7 once milk is fully in baby will consistently have 6 or more soaking wet diapers and about 4 bowel movement. Some babies have a bowel movement with every feeding and some have 1-3 large bowel movements each day. Inadequate output may indicate inadequate feedings and should be reported to your Pediatrician. Bowel habits may change as baby gets older. Encouraged follow-up at Pediatrician in 1-2 days, no later than 1 week of age. Call [facility] for any questions as needed or to set up an OP visit. OP phone calls are returned within 24 hours. Community Breastfeeding Resource List given.